# Patient Record
Sex: MALE | Race: WHITE
[De-identification: names, ages, dates, MRNs, and addresses within clinical notes are randomized per-mention and may not be internally consistent; named-entity substitution may affect disease eponyms.]

---

## 2020-11-18 PROBLEM — Z00.00 ENCOUNTER FOR PREVENTIVE HEALTH EXAMINATION: Status: ACTIVE | Noted: 2020-11-18

## 2021-01-08 ENCOUNTER — APPOINTMENT (OUTPATIENT)
Dept: CARDIOLOGY | Facility: CLINIC | Age: 51
End: 2021-01-08
Payer: COMMERCIAL

## 2021-01-08 ENCOUNTER — NON-APPOINTMENT (OUTPATIENT)
Age: 51
End: 2021-01-08

## 2021-01-08 VITALS
WEIGHT: 240 LBS | HEART RATE: 72 BPM | DIASTOLIC BLOOD PRESSURE: 86 MMHG | BODY MASS INDEX: 31.81 KG/M2 | SYSTOLIC BLOOD PRESSURE: 134 MMHG | HEIGHT: 73 IN | OXYGEN SATURATION: 95 % | TEMPERATURE: 98.6 F

## 2021-01-08 DIAGNOSIS — Z82.49 FAMILY HISTORY OF ISCHEMIC HEART DISEASE AND OTHER DISEASES OF THE CIRCULATORY SYSTEM: ICD-10-CM

## 2021-01-08 DIAGNOSIS — Z72.89 OTHER PROBLEMS RELATED TO LIFESTYLE: ICD-10-CM

## 2021-01-08 DIAGNOSIS — Z86.39 PERSONAL HISTORY OF OTHER ENDOCRINE, NUTRITIONAL AND METABOLIC DISEASE: ICD-10-CM

## 2021-01-08 DIAGNOSIS — R94.31 ABNORMAL ELECTROCARDIOGRAM [ECG] [EKG]: ICD-10-CM

## 2021-01-08 DIAGNOSIS — Z86.79 PERSONAL HISTORY OF OTHER DISEASES OF THE CIRCULATORY SYSTEM: ICD-10-CM

## 2021-01-08 DIAGNOSIS — Z78.9 OTHER SPECIFIED HEALTH STATUS: ICD-10-CM

## 2021-01-08 PROCEDURE — 99204 OFFICE O/P NEW MOD 45 MIN: CPT

## 2021-01-08 PROCEDURE — 99072 ADDL SUPL MATRL&STAF TM PHE: CPT

## 2021-01-08 PROCEDURE — 93000 ELECTROCARDIOGRAM COMPLETE: CPT

## 2021-01-08 RX ORDER — LOSARTAN POTASSIUM 25 MG/1
25 TABLET, FILM COATED ORAL
Refills: 0 | Status: ACTIVE | COMMUNITY

## 2021-01-08 NOTE — REASON FOR VISIT
[Consultation] : a consultation regarding [Hypertension] : hypertension [Medication Management] : Medication management

## 2021-01-11 NOTE — ASSESSMENT
[FreeTextEntry1] : Nonspecific EKG.  Uncontrolled diabetes.  Hypertension.  Obstructive sleep apnea.  Overweight status.  \par Monitor blood pressure at home.  Discussed goal blood pressure less than 130/80.  Blood pressure controlled on repeat examination.  \par Discussed importance of glucose control to reduce cardiovascular risk.  Plans to follow with endocrinology.  \par Treatment of obstructive sleep apnea.  \par Echocardiogram carotid Doppler nuclear stress test.  \par tart Lipitor.  Instructed to call if adverse effects.  \par Follow-up blood work requested

## 2021-01-11 NOTE — HISTORY OF PRESENT ILLNESS
[FreeTextEntry1] : KISHORE ALFORD  is a 50 year old  M\par \par Primary physician Dr. Childs.  \par History of diabetes, sleep apnea, hypertension. \par Takes Synjardy for diabetes and losartan.  \par Endorses dietary indiscretion.\par Recent blood work January 2021 hemoglobin 16.7 A1c 8.1 \par EKG demonstrates normal sinus rhythm with nonspecific septal changes.\par  \par There is no prior history of a clinical myocardial infarction, coronary revascularization. \par There is no history of symptomatic congestive heart failure rheumatic heart disease or valvular disease.\par There is no history of symptomatic arrhythmias including atrial fibrillation.\par \par There is no exertional chest pain, pressure or discomfort. \par There is no significant dyspnea on exertion or orthopnea. \par There are no symptomatic palpitations, lightheadedness, dizziness or syncope.\par   \par Mother with pacemaker.  \par

## 2021-03-12 ENCOUNTER — APPOINTMENT (OUTPATIENT)
Dept: CARDIOLOGY | Facility: CLINIC | Age: 51
End: 2021-03-12
Payer: COMMERCIAL

## 2021-03-12 PROCEDURE — 99072 ADDL SUPL MATRL&STAF TM PHE: CPT

## 2021-03-12 PROCEDURE — 93880 EXTRACRANIAL BILAT STUDY: CPT

## 2021-03-12 PROCEDURE — 78452 HT MUSCLE IMAGE SPECT MULT: CPT

## 2021-03-12 PROCEDURE — 93015 CV STRESS TEST SUPVJ I&R: CPT

## 2021-03-12 PROCEDURE — 93306 TTE W/DOPPLER COMPLETE: CPT

## 2021-03-12 PROCEDURE — A9502: CPT

## 2021-03-19 ENCOUNTER — APPOINTMENT (OUTPATIENT)
Dept: CARDIOLOGY | Facility: CLINIC | Age: 51
End: 2021-03-19
Payer: COMMERCIAL

## 2021-03-19 VITALS
HEART RATE: 78 BPM | BODY MASS INDEX: 29.69 KG/M2 | OXYGEN SATURATION: 97 % | SYSTOLIC BLOOD PRESSURE: 130 MMHG | TEMPERATURE: 98.4 F | HEIGHT: 73 IN | WEIGHT: 224 LBS | DIASTOLIC BLOOD PRESSURE: 80 MMHG

## 2021-03-19 DIAGNOSIS — E11.9 TYPE 2 DIABETES MELLITUS W/OUT COMPLICATIONS: ICD-10-CM

## 2021-03-19 DIAGNOSIS — E78.5 HYPERLIPIDEMIA, UNSPECIFIED: ICD-10-CM

## 2021-03-19 DIAGNOSIS — I10 ESSENTIAL (PRIMARY) HYPERTENSION: ICD-10-CM

## 2021-03-19 PROCEDURE — 99214 OFFICE O/P EST MOD 30 MIN: CPT

## 2021-03-19 PROCEDURE — 99072 ADDL SUPL MATRL&STAF TM PHE: CPT

## 2021-03-19 RX ORDER — EMPAGLIFLOZIN AND METFORMIN HYDROCHLORIDE 12.5; 1 MG/1; MG/1
12.5-1 TABLET ORAL TWICE DAILY
Refills: 0 | Status: ACTIVE | COMMUNITY

## 2021-03-20 NOTE — ASSESSMENT
[FreeTextEntry1] : Nonspecific EKG.  Uncontrolled diabetes.  Hypertension.  Obstructive sleep apnea.  Overweight status.  \par carotid with elevated velocities likely related to tortuosity mild valvular heart disease \par normal left ventricular function \par no significant ischemia \par continue current medical therapy \par follow-up blood work has been requested \par discussed possibility of subclinical atherosclerosis \par continue statin therapy angiotensin receptor blocker and glucose control \par does not require SBE prophylaxis or invasive evaluation \par Monitor blood pressure at home.  Discussed goal blood pressure less than 130/80.  Blood pressure controlled on repeat examination.  \par Discussed importance of glucose control to reduce cardiovascular risk.  Follows with endocrinology.  \par Treatment of obstructive sleep apnea.  \par Continue preventive Lipitor.  Instructed to call if adverse effects.  \par Follow-up blood work requested

## 2021-03-20 NOTE — HISTORY OF PRESENT ILLNESS
[FreeTextEntry1] : KISHORE ALFORD  is a 50 year old  M\par Primary physician Dr. Childs.  \par History of diabetes, sleep apnea, hypertension. \par Takes Synjardy for diabetes and losartan.  \par Endorses dietary indiscretion.\par Recent blood work January 2021 hemoglobin 16.7 A1c 8.1 \par EKG demonstrates normal sinus rhythm with nonspecific septal changes.\par  \par There is no prior history of a clinical myocardial infarction, coronary revascularization. \par There is no history of symptomatic congestive heart failure rheumatic heart disease or valvular disease.\par There is no history of symptomatic arrhythmias including atrial fibrillation.\par \par There is no exertional chest pain, pressure or discomfort. \par There is no significant dyspnea on exertion or orthopnea. \par There are no symptomatic palpitations, lightheadedness, dizziness or syncope.\par   \par lost 25 pounds \par Mother with pacemaker.  \par \par nuclear stress test demonstrates inferior defect with attenuation no ischemia\par Carotid Doppler March 2021 tortuous without stenosis \par echo demonstrates normal left ventricular function mild mitral regurgitation mild tricuspid regurgitation \par treadmill test 13 minutes peak heart rate 180 peak blood pressure 190/94 excellent exercise capacity no symptoms normal hemodynamic response no ischemia\par \par \par \par

## 2021-09-08 ENCOUNTER — APPOINTMENT (OUTPATIENT)
Dept: RADIOLOGY | Facility: CLINIC | Age: 51
End: 2021-09-08
Payer: COMMERCIAL

## 2021-09-08 PROCEDURE — 71046 X-RAY EXAM CHEST 2 VIEWS: CPT

## 2021-11-22 RX ORDER — ATORVASTATIN CALCIUM 20 MG/1
20 TABLET, FILM COATED ORAL
Qty: 30 | Refills: 0 | Status: ACTIVE | COMMUNITY
Start: 2021-01-08 | End: 1900-01-01

## 2022-03-05 NOTE — HISTORY OF PRESENT ILLNESS
[FreeTextEntry1] : KISHORE ALFORD  is a 51 year old  M\par \par Primary physician Dr. Childs.  \par History of diabetes, sleep apnea, hypertension. \par Takes Synjardy for diabetes and losartan.  \par Endorses dietary indiscretion.\par Recent blood work January 2021 hemoglobin 16.7 A1c 8.1 \par EKG demonstrates normal sinus rhythm with nonspecific septal changes.\par  \par There is no prior history of a clinical myocardial infarction, coronary revascularization. \par There is no history of symptomatic congestive heart failure rheumatic heart disease or valvular disease.\par There is no history of symptomatic arrhythmias including atrial fibrillation.\par \par There is no exertional chest pain, pressure or discomfort. \par There is no significant dyspnea on exertion or orthopnea. \par There are no symptomatic palpitations, lightheadedness, dizziness or syncope.\par   \par lost 25 pounds \par Mother with pacemaker.  \par \par nuclear stress test demonstrates inferior defect with attenuation no ischemia\par Carotid Doppler March 2021 tortuous without stenosis \par echo demonstrates normal left ventricular function mild mitral regurgitation mild tricuspid regurgitation \par treadmill test 13 minutes peak heart rate 180 peak blood pressure 190/94 excellent exercise capacity no symptoms normal hemodynamic response no ischemia\par \par Nonspecific EKG.  Uncontrolled diabetes.  Hypertension.  Obstructive sleep apnea.  Overweight status.  \par carotid with elevated velocities likely related to tortuosity mild valvular heart disease \par normal left ventricular function \par no significant ischemia \par continue current medical therapy \par follow-up blood work has been requested \par discussed possibility of subclinical atherosclerosis \par continue statin therapy angiotensin receptor blocker and glucose control \par does not require SBE prophylaxis or invasive evaluation \par Monitor blood pressure at home.  Discussed goal blood pressure less than 130/80.  Blood pressure controlled on repeat examination.  \par Discussed importance of glucose control to reduce cardiovascular risk.  Follows with endocrinology.  \par Treatment of obstructive sleep apnea.  \par Continue preventive Lipitor.  Instructed to call if adverse effects.  \par Follow-up blood work requested\par \par \par \par

## 2022-03-09 ENCOUNTER — APPOINTMENT (OUTPATIENT)
Dept: CARDIOLOGY | Facility: CLINIC | Age: 52
End: 2022-03-09

## 2022-10-27 ENCOUNTER — OFFICE (OUTPATIENT)
Dept: URBAN - METROPOLITAN AREA CLINIC 38 | Facility: CLINIC | Age: 52
Setting detail: OPHTHALMOLOGY
End: 2022-10-27
Payer: COMMERCIAL

## 2022-10-27 DIAGNOSIS — H01.001: ICD-10-CM

## 2022-10-27 DIAGNOSIS — H01.004: ICD-10-CM

## 2022-10-27 DIAGNOSIS — E11.9: ICD-10-CM

## 2022-10-27 PROBLEM — H52.7 REFRACTIVE ERROR: Status: ACTIVE | Noted: 2022-10-27

## 2022-10-27 PROCEDURE — 92004 COMPRE OPH EXAM NEW PT 1/>: CPT | Performed by: OPHTHALMOLOGY

## 2022-10-27 ASSESSMENT — REFRACTION_MANIFEST
OD_VA1: 20/20
OD_VA1: 20/20
OS_CYLINDER: -1.00
OS_AXIS: 165
OS_VA1: 20/20
OD_AXIS: 005
OD_ADD: +1.00
OD_SPHERE: +0.25
OS_ADD: +1.00
OD_CYLINDER: -0.25
OS_CYLINDER: -1.00
OD_CYLINDER: -0.25
OD_AXIS: 005
OS_AXIS: 165
OD_SPHERE: +0.25
OS_VA1: 20/20
OS_SPHERE: +1.25
OS_SPHERE: +1.25

## 2022-10-27 ASSESSMENT — REFRACTION_CURRENTRX
OD_AXIS: 007
OS_VPRISM_DIRECTION: SV
OS_OVR_VA: 20/
OD_OVR_VA: 20/
OS_CYLINDER: -1.25
OD_SPHERE: +1.00
OS_SPHERE: +2.00
OS_AXIS: 165
OD_CYLINDER: -0.50
OD_VPRISM_DIRECTION: SV

## 2022-10-27 ASSESSMENT — KERATOMETRY
OS_AXISANGLE_DEGREES: 075
OD_K2POWER_DIOPTERS: 43.50
OD_K1POWER_DIOPTERS: 43.00
OS_K1POWER_DIOPTERS: 43.00
METHOD_AUTO_MANUAL: AUTO
OS_K2POWER_DIOPTERS: 44.25
OD_AXISANGLE_DEGREES: 099

## 2022-10-27 ASSESSMENT — AXIALLENGTH_DERIVED
OD_AL: 23.6351
OD_AL: 23.6351
OS_AL: 23.0243
OS_AL: 23.2586
OS_AL: 23.2586
OD_AL: 23.4894

## 2022-10-27 ASSESSMENT — LID EXAM ASSESSMENTS
OD_BLEPHARITIS: ABSENT
OS_BLEPHARITIS: ABSENT

## 2022-10-27 ASSESSMENT — SPHEQUIV_DERIVED
OS_SPHEQUIV: 0.75
OD_SPHEQUIV: 0.5
OS_SPHEQUIV: 0.75
OS_SPHEQUIV: 1.375
OD_SPHEQUIV: 0.125
OD_SPHEQUIV: 0.125

## 2022-10-27 ASSESSMENT — CONFRONTATIONAL VISUAL FIELD TEST (CVF)
OS_FINDINGS: FULL
OD_FINDINGS: FULL

## 2022-10-27 ASSESSMENT — REFRACTION_AUTOREFRACTION
OD_AXIS: 004
OS_AXIS: 163
OD_CYLINDER: -0.50
OS_CYLINDER: -1.25
OS_SPHERE: +2.00
OD_SPHERE: +0.75

## 2022-10-27 ASSESSMENT — TONOMETRY
OS_IOP_MMHG: 12
OD_IOP_MMHG: 12

## 2022-10-27 ASSESSMENT — VISUAL ACUITY
OS_BCVA: 20/20-2
OD_BCVA: 20/40-2

## 2023-05-15 ENCOUNTER — APPOINTMENT (OUTPATIENT)
Dept: ORTHOPEDIC SURGERY | Facility: CLINIC | Age: 53
End: 2023-05-15
Payer: COMMERCIAL

## 2023-05-15 DIAGNOSIS — M17.11 UNILATERAL PRIMARY OSTEOARTHRITIS, RIGHT KNEE: ICD-10-CM

## 2023-05-15 PROCEDURE — 99214 OFFICE O/P EST MOD 30 MIN: CPT | Mod: 25

## 2023-05-15 PROCEDURE — 20611 DRAIN/INJ JOINT/BURSA W/US: CPT | Mod: RT

## 2023-05-15 PROCEDURE — 73562 X-RAY EXAM OF KNEE 3: CPT | Mod: RT

## 2023-05-15 RX ORDER — EMPAGLIFLOZIN, METFORMIN HYDROCHLORIDE 12.5; 1 MG/1; MG/1
12.5-1 TABLET, EXTENDED RELEASE ORAL
Qty: 60 | Refills: 0 | Status: ACTIVE | COMMUNITY
Start: 2023-03-20

## 2023-05-15 RX ORDER — TIRZEPATIDE 10 MG/.5ML
10 INJECTION, SOLUTION SUBCUTANEOUS
Qty: 2 | Refills: 0 | Status: ACTIVE | COMMUNITY
Start: 2023-03-04

## 2023-05-15 RX ORDER — NAPROXEN 500 MG/1
500 TABLET ORAL
Qty: 60 | Refills: 3 | Status: ACTIVE | COMMUNITY
Start: 2023-05-15 | End: 1900-01-01

## 2023-05-15 RX ORDER — SITAGLIPTIN 100 MG/1
100 TABLET, FILM COATED ORAL
Qty: 30 | Refills: 0 | Status: ACTIVE | COMMUNITY
Start: 2023-03-30

## 2023-05-15 NOTE — HISTORY OF PRESENT ILLNESS
[de-identified] : Patient with a history of ACL tearing is here for evaluation of persistent knee pain and swelling.

## 2023-05-15 NOTE — PHYSICAL EXAM
[4___] : hamstring 4[unfilled]/5 [] : patient ambulates without assistive device [Right] : right knee [Lateral] : lateral [Ramsay] : skyline [AP Standing] : anteroposterior standing [Moderate tricompartmental OA medial narrowing] : Moderate tricompartmental OA medial narrowing [Moderate patellofemoral OA] : Moderate patellofemoral OA [[id number]] : flexion 115 degrees

## 2023-10-19 ENCOUNTER — OFFICE (OUTPATIENT)
Dept: URBAN - METROPOLITAN AREA CLINIC 38 | Facility: CLINIC | Age: 53
Setting detail: OPHTHALMOLOGY
End: 2023-10-19

## 2023-10-19 DIAGNOSIS — H01.004: ICD-10-CM

## 2023-10-19 DIAGNOSIS — H25.13: ICD-10-CM

## 2023-10-19 DIAGNOSIS — H01.001: ICD-10-CM

## 2023-10-19 DIAGNOSIS — E11.9: ICD-10-CM

## 2023-10-19 PROCEDURE — 92014 COMPRE OPH EXAM EST PT 1/>: CPT | Performed by: OPHTHALMOLOGY

## 2023-10-19 ASSESSMENT — REFRACTION_MANIFEST
OS_VA1: 20/20
OS_AXIS: 180
OD_ADD: +1.25
OS_ADD: +1.25
OU_VA: 20/NI
OS_CYLINDER: -0.75
OD_CYLINDER: -0.25
OD_AXIS: 010
OD_SPHERE: +0.25
OS_AXIS: 175
OS_SPHERE: +1.25
OS_SPHERE: +1.25
OS_CYLINDER: -0.50
OS_VA1: 20/25-
OD_SPHERE: +0.25
OD_VA1: 20/20
OD_VA1: 20/20
OD_AXIS: 010
OD_CYLINDER: -0.25

## 2023-10-19 ASSESSMENT — KERATOMETRY
OD_K2POWER_DIOPTERS: 43.50
OS_K2POWER_DIOPTERS: 44.50
OD_AXISANGLE_DEGREES: 101
OS_AXISANGLE_DEGREES: 080
METHOD_AUTO_MANUAL: AUTO
OD_K1POWER_DIOPTERS: 43.00
OS_K1POWER_DIOPTERS: 43.00

## 2023-10-19 ASSESSMENT — REFRACTION_AUTOREFRACTION
OS_AXIS: 179
OD_AXIS: 008
OS_SPHERE: +1.50
OD_CYLINDER: -0.50
OS_CYLINDER: -1.00
OD_SPHERE: +0.50

## 2023-10-19 ASSESSMENT — AXIALLENGTH_DERIVED
OS_AL: 23.167
OS_AL: 23.1201
OS_AL: 23.1201
OD_AL: 23.6351
OD_AL: 23.5863
OD_AL: 23.6351

## 2023-10-19 ASSESSMENT — REFRACTION_CURRENTRX
OD_CYLINDER: -0.25
OS_AXIS: 153
OS_OVR_VA: 20/
OS_SPHERE: +2.25
OD_ADD: +1.00
OD_VPRISM_DIRECTION: SV
OS_VPRISM_DIRECTION: SV
OD_AXIS: 178
OD_SPHERE: +1.25
OS_ADD: +1.00
OS_CYLINDER: -1.00
OD_OVR_VA: 20/

## 2023-10-19 ASSESSMENT — VISUAL ACUITY
OD_BCVA: 20/40-
OS_BCVA: 20/20-2

## 2023-10-19 ASSESSMENT — SPHEQUIV_DERIVED
OD_SPHEQUIV: 0.125
OS_SPHEQUIV: 1
OD_SPHEQUIV: 0.125
OS_SPHEQUIV: 1
OD_SPHEQUIV: 0.25
OS_SPHEQUIV: 0.875

## 2023-10-19 ASSESSMENT — CONFRONTATIONAL VISUAL FIELD TEST (CVF)
OD_FINDINGS: FULL
OS_FINDINGS: FULL

## 2023-10-19 ASSESSMENT — LID EXAM ASSESSMENTS
OS_BLEPHARITIS: ABSENT
OD_BLEPHARITIS: ABSENT

## 2023-10-19 ASSESSMENT — TONOMETRY
OD_IOP_MMHG: 17
OS_IOP_MMHG: 16

## 2024-12-05 ENCOUNTER — RX ONLY (RX ONLY)
Age: 54
End: 2024-12-05

## 2024-12-05 ENCOUNTER — OFFICE (OUTPATIENT)
Dept: URBAN - METROPOLITAN AREA CLINIC 97 | Facility: CLINIC | Age: 54
Setting detail: OPHTHALMOLOGY
End: 2024-12-05
Payer: COMMERCIAL

## 2024-12-05 DIAGNOSIS — H01.001: ICD-10-CM

## 2024-12-05 DIAGNOSIS — H25.13: ICD-10-CM

## 2024-12-05 DIAGNOSIS — H52.4: ICD-10-CM

## 2024-12-05 DIAGNOSIS — H01.004: ICD-10-CM

## 2024-12-05 DIAGNOSIS — E11.9: ICD-10-CM

## 2024-12-05 PROCEDURE — 92014 COMPRE OPH EXAM EST PT 1/>: CPT | Performed by: OPHTHALMOLOGY

## 2024-12-05 PROCEDURE — 92015 DETERMINE REFRACTIVE STATE: CPT | Performed by: OPHTHALMOLOGY

## 2024-12-05 ASSESSMENT — REFRACTION_CURRENTRX
OS_SPHERE: +0.50
OS_CYLINDER: +0.75
OS_OVR_VA: 20/
OD_VPRISM_DIRECTION: SV
OS_ADD: +1.00
OS_SPHERE: +2.25
OS_ADD: +1.25
OD_AXIS: 100
OS_VPRISM_DIRECTION: SV
OD_SPHERE: +1.25
OS_OVR_VA: 20/
OD_AXIS: 178
OD_CYLINDER: +0.25
OS_AXIS: 090
OS_AXIS: 153
OD_SPHERE: PLANO
OD_CYLINDER: -0.25
OD_OVR_VA: 20/
OS_CYLINDER: -1.00
OD_VPRISM_DIRECTION: PROGS
OD_ADD: +1.25
OS_VPRISM_DIRECTION: PROGS
OD_OVR_VA: 20/
OD_ADD: +1.00

## 2024-12-05 ASSESSMENT — REFRACTION_MANIFEST
OD_CYLINDER: +0.25
OS_VA2: 20/20(J1+)
OS_ADD: +2.00
OU_VA: 20/20-2
OU_VA: 20/20-2
OD_VA2: 20/20(J1+)
OD_VA1: 20/20-2
OS_VA1: 20/25-
OS_SPHERE: PLANO
OS_AXIS: 085
OS_VA1: 20/25-
OS_CYLINDER: +0.75
OS_VA2: 20/20(J1+)
OS_CYLINDER: +0.75
OD_ADD: +2.00
OD_SPHERE: PLANO
OS_AXIS: 085
OD_AXIS: 090
OS_ADD: +1.50
OD_VA2: 20/20(J1+)
OD_ADD: +1.50
OS_SPHERE: +0.50
OD_CYLINDER: +0.25
OD_SPHERE: PLANO
OD_VA1: 20/20-2
OD_AXIS: 090

## 2024-12-05 ASSESSMENT — LID EXAM ASSESSMENTS
OD_BLEPHARITIS: ABSENT
OS_BLEPHARITIS: ABSENT

## 2024-12-05 ASSESSMENT — REFRACTION_AUTOREFRACTION
OS_AXIS: 085
OS_SPHERE: -0.50
OD_SPHERE: PLANO
OD_CYLINDER: +0.75
OS_CYLINDER: +1.75
OD_AXIS: 089

## 2024-12-05 ASSESSMENT — KERATOMETRY
OD_K1POWER_DIOPTERS: 42.75
OD_AXISANGLE_DEGREES: 167
OS_AXISANGLE_DEGREES: 077
OD_K2POWER_DIOPTERS: 43.00
OS_K2POWER_DIOPTERS: 44.25
OS_K1POWER_DIOPTERS: 43.00
METHOD_AUTO_MANUAL: AUTO

## 2024-12-05 ASSESSMENT — CONFRONTATIONAL VISUAL FIELD TEST (CVF)
OS_FINDINGS: FULL
OD_FINDINGS: FULL

## 2024-12-05 ASSESSMENT — VISUAL ACUITY
OD_BCVA: 20/40
OS_BCVA: 20/25-2